# Patient Record
Sex: FEMALE | Race: BLACK OR AFRICAN AMERICAN | Employment: PART TIME | URBAN - METROPOLITAN AREA
[De-identification: names, ages, dates, MRNs, and addresses within clinical notes are randomized per-mention and may not be internally consistent; named-entity substitution may affect disease eponyms.]

---

## 2017-01-01 ENCOUNTER — OFFICE VISIT (OUTPATIENT)
Dept: CARDIOLOGY | Facility: CLINIC | Age: 67
End: 2017-01-01
Attending: PHYSICIAN ASSISTANT
Payer: MEDICARE

## 2017-01-01 ENCOUNTER — HOSPITAL ENCOUNTER (OUTPATIENT)
Dept: CARDIOLOGY | Facility: CLINIC | Age: 67
Discharge: HOME | End: 2017-12-06
Attending: PHYSICIAN ASSISTANT | Admitting: PHYSICIAN ASSISTANT
Payer: MEDICARE

## 2017-01-01 VITALS
HEART RATE: 90 BPM | HEIGHT: 64 IN | SYSTOLIC BLOOD PRESSURE: 123 MMHG | BODY MASS INDEX: 34.21 KG/M2 | DIASTOLIC BLOOD PRESSURE: 88 MMHG | WEIGHT: 200.4 LBS

## 2017-01-01 VITALS
BODY MASS INDEX: 34.25 KG/M2 | WEIGHT: 200.6 LBS | SYSTOLIC BLOOD PRESSURE: 124 MMHG | HEIGHT: 64 IN | HEART RATE: 81 BPM | DIASTOLIC BLOOD PRESSURE: 85 MMHG

## 2017-01-01 VITALS — DIASTOLIC BLOOD PRESSURE: 80 MMHG | SYSTOLIC BLOOD PRESSURE: 120 MMHG | HEART RATE: 96 BPM

## 2017-01-01 DIAGNOSIS — I48.92 ATRIAL FLUTTER, UNSPECIFIED TYPE (H): ICD-10-CM

## 2017-01-01 DIAGNOSIS — R07.89 CHEST DISCOMFORT: ICD-10-CM

## 2017-01-01 DIAGNOSIS — R07.89 CHEST DISCOMFORT: Primary | ICD-10-CM

## 2017-01-01 DIAGNOSIS — I48.20 CHRONIC ATRIAL FIBRILLATION (H): Primary | ICD-10-CM

## 2017-01-01 DIAGNOSIS — I48.20 CHRONIC ATRIAL FIBRILLATION (H): ICD-10-CM

## 2017-01-01 PROCEDURE — 93018 CV STRESS TEST I&R ONLY: CPT | Performed by: INTERNAL MEDICINE

## 2017-01-01 PROCEDURE — A9502 TC99M TETROFOSMIN: HCPCS | Performed by: PHYSICIAN ASSISTANT

## 2017-01-01 PROCEDURE — 34300033 ZZH RX 343: Performed by: PHYSICIAN ASSISTANT

## 2017-01-01 PROCEDURE — 93016 CV STRESS TEST SUPVJ ONLY: CPT | Performed by: INTERNAL MEDICINE

## 2017-01-01 PROCEDURE — 93000 ELECTROCARDIOGRAM COMPLETE: CPT | Performed by: PHYSICIAN ASSISTANT

## 2017-01-01 PROCEDURE — 93017 CV STRESS TEST TRACING ONLY: CPT

## 2017-01-01 PROCEDURE — 25000128 H RX IP 250 OP 636: Performed by: INTERNAL MEDICINE

## 2017-01-01 PROCEDURE — 99213 OFFICE O/P EST LOW 20 MIN: CPT | Performed by: PHYSICIAN ASSISTANT

## 2017-01-01 PROCEDURE — 78452 HT MUSCLE IMAGE SPECT MULT: CPT | Mod: 26 | Performed by: INTERNAL MEDICINE

## 2017-01-01 PROCEDURE — 99214 OFFICE O/P EST MOD 30 MIN: CPT | Performed by: PHYSICIAN ASSISTANT

## 2017-01-01 RX ORDER — ACYCLOVIR 200 MG/1
0-1 CAPSULE ORAL
Status: DISCONTINUED | OUTPATIENT
Start: 2017-01-01 | End: 2017-01-01 | Stop reason: HOSPADM

## 2017-01-01 RX ORDER — AMINOPHYLLINE 25 MG/ML
50-100 INJECTION, SOLUTION INTRAVENOUS
Status: COMPLETED | OUTPATIENT
Start: 2017-01-01 | End: 2017-01-01

## 2017-01-01 RX ORDER — ASPIRIN 81 MG
100 TABLET, DELAYED RELEASE (ENTERIC COATED) ORAL 2 TIMES DAILY
COMMUNITY

## 2017-01-01 RX ORDER — REGADENOSON 0.08 MG/ML
0.4 INJECTION, SOLUTION INTRAVENOUS ONCE
Status: COMPLETED | OUTPATIENT
Start: 2017-01-01 | End: 2017-01-01

## 2017-01-01 RX ORDER — METOPROLOL SUCCINATE 100 MG/1
100 TABLET, EXTENDED RELEASE ORAL 2 TIMES DAILY
Qty: 180 TABLET | Refills: 3 | Status: SHIPPED | OUTPATIENT
Start: 2017-01-01 | End: 2018-01-01

## 2017-01-01 RX ORDER — ALBUTEROL SULFATE 90 UG/1
2 AEROSOL, METERED RESPIRATORY (INHALATION) EVERY 5 MIN PRN
Status: DISCONTINUED | OUTPATIENT
Start: 2017-01-01 | End: 2017-01-01 | Stop reason: HOSPADM

## 2017-01-01 RX ADMIN — AMINOPHYLLINE 75 MG: 25 INJECTION, SOLUTION INTRAVENOUS at 14:43

## 2017-01-01 RX ADMIN — TETROFOSMIN 18.63 MCI.: 1.38 INJECTION, POWDER, LYOPHILIZED, FOR SOLUTION INTRAVENOUS at 13:54

## 2017-01-01 RX ADMIN — TETROFOSMIN 5.46 MCI.: 1.38 INJECTION, POWDER, LYOPHILIZED, FOR SOLUTION INTRAVENOUS at 12:30

## 2017-01-01 RX ADMIN — REGADENOSON 0.4 MG: 0.08 INJECTION, SOLUTION INTRAVENOUS at 13:51

## 2017-01-09 ENCOUNTER — HOSPITAL ENCOUNTER (OUTPATIENT)
Dept: CARDIOLOGY | Facility: CLINIC | Age: 67
Discharge: HOME | End: 2017-01-09
Attending: PHYSICIAN ASSISTANT | Admitting: PHYSICIAN ASSISTANT
Payer: MEDICARE

## 2017-01-09 DIAGNOSIS — I48.20 CHRONIC ATRIAL FIBRILLATION (H): ICD-10-CM

## 2017-01-09 PROCEDURE — 93225 XTRNL ECG REC<48 HRS REC: CPT

## 2017-01-09 PROCEDURE — 93227 XTRNL ECG REC<48 HR R&I: CPT | Performed by: INTERNAL MEDICINE

## 2017-01-19 ENCOUNTER — OFFICE VISIT (OUTPATIENT)
Dept: CARDIOLOGY | Facility: CLINIC | Age: 67
End: 2017-01-19
Attending: PHYSICIAN ASSISTANT
Payer: MEDICARE

## 2017-01-19 VITALS
BODY MASS INDEX: 32.65 KG/M2 | DIASTOLIC BLOOD PRESSURE: 82 MMHG | SYSTOLIC BLOOD PRESSURE: 124 MMHG | HEIGHT: 65 IN | HEART RATE: 88 BPM | WEIGHT: 196 LBS

## 2017-01-19 DIAGNOSIS — I48.20 CHRONIC ATRIAL FIBRILLATION (H): ICD-10-CM

## 2017-01-19 PROCEDURE — 99213 OFFICE O/P EST LOW 20 MIN: CPT | Performed by: PHYSICIAN ASSISTANT

## 2017-01-19 NOTE — PATIENT INSTRUCTIONS
1. Reviewed heart rate monitor worn 1/9.  Heart rate is not perfect on the metoprolol 100 mg twice a day   - We talked about adding a medication called DILTIAZEM to keep the heart rate down, but you are not interested in this right now.    2. Will plan to have you see Dr. Mccormick in 6 months with echocardiogram to check your pumping function of heart. We'll make sure that pumping strength is not worse given the rate of atrial fibrillation.  I do not anticipate this!    3. Call if you start getting palpitations or feel funny in heart and we can start the medication. My nurse Nisreen: [phone]

## 2017-01-19 NOTE — MR AVS SNAPSHOT
After Visit Summary   1/19/2017    Ele Mendoza    MRN: [id number]           Patient Information     Date Of Birth          1950        Visit Information        Provider Department      1/19/2017 12:15 PM Mariana George PA-C; [employer] [facility] HE[facility]        Today's Diagnoses     Chronic atrial fibrillation (H)           Care Instructions    1. Reviewed heart rate monitor worn 1/9.  Heart rate is not perfect on the metoprolol 100 mg twice a day   - We talked about adding a medication called DILTIAZEM to keep the heart rate down, but you are not interested in this right now.    2. Will plan to have you see Dr. Mccormick in 6 months with echocardiogram to check your pumping function of heart. We'll make sure that pumping strength is not worse given the rate of atrial fibrillation.  I do not anticipate this!    3. Call if you start getting palpitations or feel funny in heart and we can start the medication. My nurse Nisreen: [phone]        Follow-ups after your visit        Additional Services     Follow-Up with Cardiologist                 Future tests that were ordered for you today     Open Future Orders        Priority Expected Expires Ordered    Echocardiogram Routine 7/18/2017 1/19/2018 1/19/2017    Follow-Up with Cardiologist Routine 7/18/2017 1/19/2018 1/19/2017            Who to contact     If you have questions or need follow up information about today's clinic visit or your schedule please contact [facility] [place] directly at [phone].  Normal or non-critical lab and imaging results will be communicated to you by MyChart, letter or phone within 4 business days after the clinic has received the results. If you do not hear from us within 7 days, please contact the clinic through MyChart or phone. If you have a critical or abnormal lab result, we will notify you by phone as  "soon as possible.  Submit refill requests through [employer] or call your pharmacy and they will forward the refill request to us. Please allow 3 business days for your refill to be completed.          Additional Information About Your Visit        [employer] Information     [employer] lets you send messages to your doctor, view your test results, renew your prescriptions, schedule appointments and more. To sign up, go to [email or web address] . Click on \"Log in\" on the left side of the screen, which will take you to the Welcome page. Then click on \"Sign up Now\" on the right side of the page.     You will be asked to enter the access code listed below, as well as some personal information. Please follow the directions to create your username and password.     Your access code is: [email or web address]  Expires: 2017 12:58 PM     Your access code will  in 90 days. If you need help or a new code, please call your [place] clinic or [phone].        Care EveryWhere ID     This is your Care EveryWhere ID. This could be used by other organizations to access your [place] medical records  [id number]        Your Vitals Were     Pulse Height BMI (Body Mass Index)             88 1.651 m (5' 5\") 32.62 kg/m2          Blood Pressure from Last 3 Encounters:   17 124/82   16 136/88   16 110/76    Weight from Last 3 Encounters:   17 88.905 kg (196 lb)   16 87.998 kg (194 lb)   16 87.544 kg (193 lb)              We Performed the Following     Follow-Up with Cardiac Advanced Practice Provider        Primary Care Provider Office Phone # Fax #    Nereida Hany [phone]       [facility] [address] [facility] [address]        Thank you!     Thank you for choosing [facility] [place]  for your care. Our goal is always to provide you with excellent care. Hearing back from our patients is one way we can continue to improve our " services. Please take a few minutes to complete the written survey that you may receive in the mail after your visit with us. Thank you!             Your Updated Medication List - Protect others around you: Learn how to safely use, store and throw away your medicines at www.disposemymeds.org.          This list is accurate as of: 1/19/17 12:58 PM.  Always use your most recent med list.                   Brand Name Dispense Instructions for use    metoprolol 100 MG 24 hr tablet    TOPROL-XL    180 tablet    Take 1 tablet (100 mg) by mouth 2 times daily       rivaroxaban ANTICOAGULANT 20 MG Tabs tablet    XARELTO    90 tablet    Take 1 tablet (20 mg) by mouth daily

## 2017-01-19 NOTE — PROGRESS NOTES
HPI and Plan:   See dictation #[id number]    Orders Placed This Encounter   Procedures     Follow-Up with Cardiologist     Echocardiogram       Encounter Diagnosis   Name Primary?     Chronic atrial fibrillation (H)        CURRENT MEDICATIONS:  Current Outpatient Prescriptions   Medication Sig Dispense Refill     metoprolol (TOPROL-XL) 100 MG 24 hr tablet Take 1 tablet (100 mg) by mouth 2 times daily 180 tablet 3     rivaroxaban ANTICOAGULANT (XARELTO) 20 MG TABS tablet Take 1 tablet (20 mg) by mouth daily 90 tablet 3       ALLERGIES   No Known Allergies    PAST MEDICAL HISTORY:  Past Medical History   Diagnosis Date     Atrial fibrillation (H)      Cholelithiasis      Congestive heart failure (H)      Atrial flutter (H) 3/25/2015     Edema-pedal 5/29/2015     Dizziness 10/18/2016       PAST SURGICAL HISTORY:  History reviewed. No pertinent past surgical history.    FAMILY HISTORY:  Family History   Problem Relation Age of Onset     Coronary Artery Disease No family hx of      Unknown/Adopted Father      Unknown/Adopted Mother      Family History Negative Brother      Family History Negative Sister      Family History Negative Son      Family History Negative Daughter      Family History Negative Brother        SOCIAL HISTORY:  Social History     Social History     Marital Status: Single     Spouse Name: N/A     Number of Children: N/A     Years of Education: N/A     Social History Main Topics     Smoking status: Never Smoker      Smokeless tobacco: Never Used     Alcohol Use: No     Drug Use: No     Sexual Activity: Not Asked     Other Topics Concern     Caffeine Concern No     Sleep Concern No     Stress Concern No     Weight Concern No     Special Diet No     Back Care No     Exercise Yes     sometimes walking     Seat Belt Yes     Social History Narrative       Review of Systems:  Skin:  Negative       Eyes:  Negative for double vision;visual blurring    ENT:  Negative      Respiratory:  Negative for dyspnea on  "exertion;shortness of breath;cough     Cardiovascular:  Negative for;palpitations;chest pain;edema;syncope or near-syncope;exercise intolerance;lightheadedness;dizziness      Gastroenterology: Negative for hematochezia;melena;nausea;vomiting    Genitourinary:  Positive for nocturia    Musculoskeletal:  Positive for foot pain recent R foot surgery  Neurologic:  Negative for headaches;stroke    Psychiatric:  Negative      Heme/Lymph/Imm:  Negative      Endocrine:  Negative        Physical Exam:  Vitals: /82 mmHg  Pulse 88  Ht 1.651 m (5' 5\")  Wt 88.905 kg (196 lb)  BMI 32.62 kg/m2    Constitutional:  cooperative, alert and oriented, well developed, well nourished, in no acute distress        Skin:  warm and dry to the touch, no apparent skin lesions or masses noted        Head:  normocephalic, no masses or lesions        Eyes:  pupils equal and round;conjunctivae and lids unremarkable;sclera white;no xanthalasma;EOMS intact;no nystagmus        ENT:  no pallor or cyanosis, dentition good        Neck:  JVP normal;no carotid bruit        Chest:  normal breath sounds, clear to auscultation, normal A-P diameter, normal symmetry, normal respiratory excursion, no use of accessory muscles          Cardiac:   irregularly irregular rhythm                Abdomen:  abdomen soft        Vascular: pulses full and equal                                        Extremities and Back:  no edema;no deformities, clubbing, cyanosis, erythema observed         R foot wrapped    Neurological:  affect appropriate, oriented to time, person and place   No weakness, tongue deviation, paralysis, tremors or speech pattern changes. CN III-XII intact on exam today                    "

## 2017-01-19 NOTE — Clinical Note
1/19/2017    [facility]   [address]  [facility] [address]    RE: Ele Mendoza       Dear Colleague,    I had the pleasure of seeing Ele today when she came accompanied by her  and son for followup of her recent Holter monitor.  She is a pleasant 67-year-old.  She has a history of permanent atrial fibrillation for which she remains on Xarelto.  We have been working with some rate-controlling medications given a Holter monitor that Dr. Mccormick got back in 06/2016.  At that time, her average heart rate was 90 beats per minute; however, the reading physician noted that the heart rate was usually over 100 during the daytime hours.  At that time we increased her metoprolol XL to 200 mg twice daily.  She came back shortly thereafter with some lightheadedness/dizziness, and we decreased the dose but repeat Holter monitor showed worsening of her average heart rate to 96 with max up to 133.  She felt that the dizziness was more likely related to a migraine headache and actually at that time acquiesced to going back on metoprolol succinate 100 mg twice daily.  She now just had another Holter monitor to check her heart rate control on that and is back for results.      Ele tells me she is feeling well.  She recently had some right foot surgery and that has laid her up a bit, and she is not nearly as active as she would like.  She denies chest pain, pressure or tightness.  Denies orthopnea, PND, edema and denies any problems on the Xarelto therapy.      Holter monitor worn 01/09/2017 showed permanent atrial fibrillation with an average heart rate of 94 and minimum was down to 58, max was 122.  Again, we noted these rates when she was relatively inactive given her recent foot surgery.  Daytime hours, specifically between 10:00 a.m. and 2:00 p.m. tend to be in the low 100s.      Outpatient Encounter Prescriptions as of 1/19/2017   Medication Sig Dispense Refill      metoprolol (TOPROL-XL) 100 MG 24 hr tablet Take 1 tablet (100 mg) by mouth 2 times daily 180 tablet 3     rivaroxaban ANTICOAGULANT (XARELTO) 20 MG TABS tablet Take 1 tablet (20 mg) by mouth daily 90 tablet 3     No facility-administered encounter medications on file as of 1/19/2017.     ASSESSMENT AND PLAN:     1.  Permanent atrial fibrillation.  Heart rate control still not ideal given the fact that her average heart rates between   10 AM and 2 PM are generally in the low 100s/110s.  I did talk to her today about continuing metoprolol succinate 100 mg twice daily and adding diltiazem.  She is not interested in adding more medication or making any significant changes to her metoprolol dosing due to concerns regarding side effects.  She is asymptomatic, and we had a long discussion regarding the need to keep heart rate down.  I think at this point, however, with an average heart rate still in the 90s, I do not think we would have to worry about a tachycardia-induced cardiomyopathy as much and have agreed to let her continue to monitor this.      At this time, therefore, we will make no medication changes.  I have asked her to contact me if she starts getting any lightheadedness, palpitations or other problems, and we can certainly consider adding diltiazem at that time.  If, however, she is doing well, we will have her follow up with Dr. Mccormick in 6 months.  We will also check an echocardiogram at that time to ensure that her ejection fraction, which was normal in 06/2015 at 55%-60%, stays within normal limits.  I did not order a Holter monitor for 6 months from now given the fact that we are not making any medication changes.  However, if in between our visits if we end up making a change, we can certainly get a 24-hour monitor to ensure it is working.      It has been a pleasure to see her in clinic.  She will continue on Xarelto and see her primary cardiologist in 6 months.     Sincerely,    Mariana  Alex George PA-C     [facility]

## 2017-01-20 NOTE — PROGRESS NOTES
HISTORY OF PRESENT ILLNESS:  I had the pleasure of seeing Ele today when she came accompanied by her  and son for followup of her recent Holter monitor.  She is a pleasant 67-year-old.  She has a history of permanent atrial fibrillation for which she remains on Xarelto.  We have been working with some rate-controlling medications given a Holter monitor that Dr. Mccormick got back in 06/2016.  At that time, her average heart rate was 90 beats per minute; however, the reading physician noted that the heart rate was usually over 100 during the daytime hours.  At that time we increased her metoprolol XL to 200 mg twice daily.  She came back shortly thereafter with some lightheadedness/dizziness, and we decreased the dose but repeat Holter monitor showed worsening of her average heart rate to 96 with max up to 133.  She felt that the dizziness was more likely related to a migraine headache and actually at that time acquiesced to going back on metoprolol succinate 100 mg twice daily.  She now just had another Holter monitor to check her heart rate control on that and is back for results.      Ele tells me she is feeling well.  She recently had some right foot surgery and that has laid her up a bit, and she is not nearly as active as she would like.  She denies chest pain, pressure or tightness.  Denies orthopnea, PND, edema and denies any problems on the Xarelto therapy.      Holter monitor worn 01/09/2017 showed permanent atrial fibrillation with an average heart rate of 94 and minimum was down to 58, max was 122.  Again, we noted these rates when she was relatively inactive given her recent foot surgery.  Daytime hours, specifically between 10:00 a.m. and 2:00 p.m. tend to be in the low 100s.        ASSESSMENT AND PLAN:     1.  Permanent atrial fibrillation.  Heart rate control still not ideal given the fact that her average heart rates between   10 AM and 2 PM are generally in the low 100s/110s.  I  did talk to her today about continuing metoprolol succinate 100 mg twice daily and adding diltiazem.  She is not interested in adding more medication or making any significant changes to her metoprolol dosing due to concerns regarding side effects.  She is asymptomatic, and we had a long discussion regarding the need to keep heart rate down.  I think at this point, however, with an average heart rate still in the 90s, I do not think we would have to worry about a tachycardia-induced cardiomyopathy as much and have agreed to let her continue to monitor this.      At this time, therefore, we will make no medication changes.  I have asked her to contact me if she starts getting any lightheadedness, palpitations or other problems, and we can certainly consider adding diltiazem at that time.  If, however, she is doing well, we will have her follow up with Dr. Mccormick in 6 months.  We will also check an echocardiogram at that time to ensure that her ejection fraction, which was normal in 2015 at 55%-60%, stays within normal limits.  I did not order a Holter monitor for 6 months from now given the fact that we are not making any medication changes.  However, if in between our visits if we end up making a change, we can certainly get a 24-hour monitor to ensure it is working.      It has been a pleasure to see her in clinic.  She will continue on Xarelto and see her primary cardiologist in 6 months.         AMANDA VARGAS PA-C             D: 2017 13:08   T: 2017 23:57   MT: ANITA      Name:     PHILIP OLIVIA   MRN:      [id number]        Account:      [id number]   :      1950           Service Date: 2017      Document: [id number]

## 2017-01-27 ENCOUNTER — HOSPITAL ENCOUNTER (EMERGENCY)
Facility: CLINIC | Age: 67
Discharge: HOME | End: 2017-01-27
Attending: EMERGENCY MEDICINE | Admitting: EMERGENCY MEDICINE
Payer: MEDICARE

## 2017-01-27 ENCOUNTER — APPOINTMENT (OUTPATIENT)
Dept: GENERAL RADIOLOGY | Facility: CLINIC | Age: 67
End: 2017-01-27
Attending: EMERGENCY MEDICINE
Payer: MEDICARE

## 2017-01-27 ENCOUNTER — APPOINTMENT (OUTPATIENT)
Dept: CT IMAGING | Facility: CLINIC | Age: 67
End: 2017-01-27
Attending: EMERGENCY MEDICINE
Payer: MEDICARE

## 2017-01-27 VITALS
BODY MASS INDEX: 32.44 KG/M2 | HEART RATE: 104 BPM | DIASTOLIC BLOOD PRESSURE: 84 MMHG | WEIGHT: 190 LBS | TEMPERATURE: 100.1 F | OXYGEN SATURATION: 100 % | RESPIRATION RATE: 18 BRPM | SYSTOLIC BLOOD PRESSURE: 114 MMHG | HEIGHT: 64 IN

## 2017-01-27 DIAGNOSIS — J18.9 PNEUMONIA OF RIGHT LUNG DUE TO INFECTIOUS ORGANISM, UNSPECIFIED PART OF LUNG: ICD-10-CM

## 2017-01-27 DIAGNOSIS — R51.9 ACUTE NONINTRACTABLE HEADACHE, UNSPECIFIED HEADACHE TYPE: ICD-10-CM

## 2017-01-27 LAB
ALBUMIN UR-MCNC: NEGATIVE MG/DL
ANION GAP SERPL CALCULATED.3IONS-SCNC: 6 MMOL/L (ref 3–14)
APPEARANCE UR: CLEAR
BACTERIA #/AREA URNS HPF: ABNORMAL /HPF
BASOPHILS # BLD AUTO: 0 10E9/L (ref 0–0.2)
BASOPHILS NFR BLD AUTO: 0.6 %
BILIRUB UR QL STRIP: NEGATIVE
BUN SERPL-MCNC: 12 MG/DL (ref 7–30)
CALCIUM SERPL-MCNC: 8.5 MG/DL (ref 8.5–10.1)
CHLORIDE SERPL-SCNC: 105 MMOL/L (ref 94–109)
CO2 SERPL-SCNC: 25 MMOL/L (ref 20–32)
COLOR UR AUTO: ABNORMAL
CREAT SERPL-MCNC: 0.73 MG/DL (ref 0.52–1.04)
DIFFERENTIAL METHOD BLD: ABNORMAL
EOSINOPHIL # BLD AUTO: 0.1 10E9/L (ref 0–0.7)
EOSINOPHIL NFR BLD AUTO: 1.4 %
ERYTHROCYTE [DISTWIDTH] IN BLOOD BY AUTOMATED COUNT: 13.4 % (ref 10–15)
FLUAV+FLUBV AG SPEC QL: NEGATIVE
FLUAV+FLUBV AG SPEC QL: NORMAL
GFR SERPL CREATININE-BSD FRML MDRD: 80 ML/MIN/1.7M2
GLUCOSE SERPL-MCNC: 81 MG/DL (ref 70–99)
GLUCOSE UR STRIP-MCNC: NEGATIVE MG/DL
HCT VFR BLD AUTO: 40.9 % (ref 35–47)
HGB BLD-MCNC: 13.5 G/DL (ref 11.7–15.7)
HGB UR QL STRIP: NEGATIVE
IMM GRANULOCYTES # BLD: 0 10E9/L (ref 0–0.4)
IMM GRANULOCYTES NFR BLD: 0.2 %
INTERPRETATION ECG - MUSE: NORMAL
KETONES UR STRIP-MCNC: NEGATIVE MG/DL
LEUKOCYTE ESTERASE UR QL STRIP: NEGATIVE
LYMPHOCYTES # BLD AUTO: 0.7 10E9/L (ref 0.8–5.3)
LYMPHOCYTES NFR BLD AUTO: 13.1 %
MCH RBC QN AUTO: 29.3 PG (ref 26.5–33)
MCHC RBC AUTO-ENTMCNC: 33 G/DL (ref 31.5–36.5)
MCV RBC AUTO: 89 FL (ref 78–100)
MONOCYTES # BLD AUTO: 0.6 10E9/L (ref 0–1.3)
MONOCYTES NFR BLD AUTO: 11.5 %
NEUTROPHILS # BLD AUTO: 3.7 10E9/L (ref 1.6–8.3)
NEUTROPHILS NFR BLD AUTO: 73.2 %
NITRATE UR QL: NEGATIVE
NRBC # BLD AUTO: 0 10*3/UL
NRBC BLD AUTO-RTO: 0 /100
PH UR STRIP: 6.5 PH (ref 5–7)
PLATELET # BLD AUTO: 151 10E9/L (ref 150–450)
POTASSIUM SERPL-SCNC: 4.2 MMOL/L (ref 3.4–5.3)
RBC # BLD AUTO: 4.61 10E12/L (ref 3.8–5.2)
RBC #/AREA URNS AUTO: <1 /HPF (ref 0–2)
SODIUM SERPL-SCNC: 136 MMOL/L (ref 133–144)
SP GR UR STRIP: 1 (ref 1–1.03)
SPECIMEN SOURCE: NORMAL
URN SPEC COLLECT METH UR: ABNORMAL
UROBILINOGEN UR STRIP-MCNC: NORMAL MG/DL (ref 0–2)
WBC # BLD AUTO: 5 10E9/L (ref 4–11)
WBC #/AREA URNS AUTO: 0 /HPF (ref 0–2)

## 2017-01-27 PROCEDURE — 93005 ELECTROCARDIOGRAM TRACING: CPT

## 2017-01-27 PROCEDURE — 71020 XR CHEST 2 VW: CPT

## 2017-01-27 PROCEDURE — 96360 HYDRATION IV INFUSION INIT: CPT

## 2017-01-27 PROCEDURE — 87804 INFLUENZA ASSAY W/OPTIC: CPT | Performed by: EMERGENCY MEDICINE

## 2017-01-27 PROCEDURE — 70450 CT HEAD/BRAIN W/O DYE: CPT

## 2017-01-27 PROCEDURE — 80048 BASIC METABOLIC PNL TOTAL CA: CPT | Performed by: EMERGENCY MEDICINE

## 2017-01-27 PROCEDURE — 81001 URINALYSIS AUTO W/SCOPE: CPT | Performed by: EMERGENCY MEDICINE

## 2017-01-27 PROCEDURE — 85025 COMPLETE CBC W/AUTO DIFF WBC: CPT | Performed by: EMERGENCY MEDICINE

## 2017-01-27 PROCEDURE — 25000128 H RX IP 250 OP 636: Performed by: EMERGENCY MEDICINE

## 2017-01-27 PROCEDURE — 25000132 ZZH RX MED GY IP 250 OP 250 PS 637: Performed by: EMERGENCY MEDICINE

## 2017-01-27 PROCEDURE — 99285 EMERGENCY DEPT VISIT HI MDM: CPT | Mod: 25

## 2017-01-27 RX ORDER — LIDOCAINE 40 MG/G
CREAM TOPICAL
Status: DISCONTINUED | OUTPATIENT
Start: 2017-01-27 | End: 2017-01-27 | Stop reason: HOSPADM

## 2017-01-27 RX ORDER — TRAMADOL HYDROCHLORIDE 50 MG/1
100 TABLET ORAL ONCE
Status: COMPLETED | OUTPATIENT
Start: 2017-01-27 | End: 2017-01-27

## 2017-01-27 RX ORDER — SODIUM CHLORIDE 9 MG/ML
1000 INJECTION, SOLUTION INTRAVENOUS CONTINUOUS
Status: DISCONTINUED | OUTPATIENT
Start: 2017-01-27 | End: 2017-01-27 | Stop reason: HOSPADM

## 2017-01-27 RX ORDER — AZITHROMYCIN 250 MG/1
TABLET, FILM COATED ORAL
Qty: 6 TABLET | Refills: 0 | Status: SHIPPED | OUTPATIENT
Start: 2017-01-27 | End: 2017-02-01

## 2017-01-27 RX ORDER — TRAMADOL HYDROCHLORIDE 50 MG/1
50-100 TABLET ORAL EVERY 6 HOURS PRN
Qty: 20 TABLET | Refills: 0 | Status: SHIPPED | OUTPATIENT
Start: 2017-01-27

## 2017-01-27 RX ORDER — ACETAMINOPHEN 325 MG/1
975 TABLET ORAL ONCE
Status: COMPLETED | OUTPATIENT
Start: 2017-01-27 | End: 2017-01-27

## 2017-01-27 RX ADMIN — TRAMADOL HYDROCHLORIDE 100 MG: 50 TABLET, COATED ORAL at 14:12

## 2017-01-27 RX ADMIN — ACETAMINOPHEN 975 MG: 325 TABLET, FILM COATED ORAL at 12:45

## 2017-01-27 RX ADMIN — SODIUM CHLORIDE 1000 ML: 9 INJECTION, SOLUTION INTRAVENOUS at 13:14

## 2017-01-27 ASSESSMENT — ENCOUNTER SYMPTOMS
WEAKNESS: 1
HEADACHES: 1
CONSTIPATION: 0
COUGH: 1
DIZZINESS: 1
DIARRHEA: 0
ABDOMINAL PAIN: 0
SHORTNESS OF BREATH: 0
VOMITING: 0
SORE THROAT: 1
FEVER: 1
MYALGIAS: 1
NAUSEA: 0

## 2017-01-27 NOTE — DISCHARGE INSTRUCTIONS
Pneumonia (Adult)  Pneumonia is an infection deep within the lungs. It is in the small air sacs (alveoli). Pneumonia may be caused by a virus or bacteria. Pneumonia caused by bacteria is usually treated with an antibiotic. Severe cases may need to be treated in the hospital. Milder cases can be treated at home. Symptoms usually start to get better during the first 2 days of treatment.    Home care  Follow these guidelines when caring for yourself at home:    Rest at home for the first 2 to 3 days, or until you feel stronger. Don t let yourself get overly tired when you go back to your activities.    Stay away from cigarette smoke - yours or other people s.    You may use acetaminophen or ibuprofen to control fever or pain, unless another medicine was prescribed. If you have chronic liver or kidney disease, talk with your health care provider before using these medicines. Also talk with your provider if you ve had a stomach ulcer or GI bleeding. Don t give aspirin to anyone younger than 18 years of age who is ill with a fever. It may cause severe liver damage.    Your appetite may be poor, so a light diet is fine.    Drink 6 to 8 glasses of fluids every day to make sure you are getting enough fluids. Beverages can include water, sport drinks, sodas without caffeine, juices, tea, or soup. Fluids will help loosen secretions in the lung. This will make it easier for you to cough up the phlegm (sputum). If you also have heart or kidney disease, check with your health care provider before you drink extra fluids.    Take antibiotic medicine prescribed until it is all gone, even if you are feeling better after a few days.  Follow-up care  Follow up with your health care provider in the next 2 to 3 days, or as advised. This is to be sure the medicine is helping you get better.  If you are 65 or older, you should get a pneumococcal vaccine and a yearly flu (influenza) shot. You should also get these vaccines if you have  chronic lung disease like asthma, emphysema, or COPD. Ask your provider about this.  When to seek medical advice  Call your health care provider right away if any of these occur:    You don t get better within the first 48 hours of treatment    Shortness of breath gets worse    Rapid breathing (more than 25 breaths per minute)    Coughing up blood    Chest pain gets worse with breathing    Fever of 102 F (38 C) or higher that doesn t get better with fever medicine    Weakness, dizziness, or fainting that gets worse    Thirst or dry mouth that gets worse    Sinus pain, headache, or a stiff neck    Chest pain not caused by coughing    [id number] [employer]. [address] [place] [address]. All rights reserved. This information is not intended as a substitute for professional medical care. Always follow your healthcare professional's instructions.      Discharge Instructions  Headache    You were seen today for a headache. Headaches may be caused by many different things such as muscle tension, sinus inflammation, anxiety and stress, having too little sleep, too much alcohol, some medical conditions or injury. You may have a migraine, which is caused by changes in the blood vessels in your head.  At this time your doctor does not find that your headache is a sign of anything dangerous or life-threatening.  However, sometimes the signs of serious illness do not show up right away.  If you have new or worse symptoms, you may need to be seen again in the emergency department or by your primary doctor.      Return to the Emergency Department if:    You get a fever of 101 F or higher.    Your headache gets much worse.    You get a stiff neck with your headache.    You get a new headache that is different or worse than headaches you have had before.    You are vomiting and can t keep food or water down.    You have blurry or double vision or other problems with your eyes.    You have a new weakness on  one side of your body.    You have difficulty with balance which is new.    You or your family thinks you are confused.    You have a seizure or convulsion.    What can I do to help myself?    Pain medications - You may take a pain medication such as Tylenol  (acetaminophen), Advil , Nuprin  (ibuprofen) or Aleve  (naproxen).  If you have been given a narcotic such as Vicodin  (hydrocodone with acetaminophen), Percocet  (oxycodone with acetaminophen), codeine, or a muscle relaxant such as Flexeril  (cyclobenzaprine) or Soma  (carisoprodol), do not drive for four hours after you have taken it. If the narcotic contains Tylenol  (acetaminophen), do not take Tylenol  with it. All narcotics will cause constipation, so eat a high fiber diet.        Take a pain reliever as soon as you notice symptoms.  Starting medications as soon as you start to have symptoms may lessen the amount of pain you have.    Relaxing in a quiet, dark room may help.    Get enough sleep and eat meals regularly.    Schedule an appointment with your primary physician as instructed, or at least within 1 week.    You may need to watch for certain foods or other things which may trigger your headaches.  Keeping a journal of your headaches and possible triggers may help you and your primary doctor to identify things which you should avoid which may be causing your headaches.  If you were given a prescription for medicine here today, be sure to read all of the information (including the package insert) that comes with your prescription.  This will include important information about the medicine, its side effects, and any warnings that you need to know about.  The pharmacist who fills the prescription can provide more information and answer questions you may have about the medicine.  If you have questions or concerns that the pharmacist cannot address, please call or return to the Emergency Department.     Remember that you can always come back to the  Emergency Department if you are not able to see your regular doctor in the amount of time listed above, if you get any new symptoms, or if there is anything that worries you.

## 2017-01-27 NOTE — ED AVS SNAPSHOT
[facility] Emergency Department    [address]    [facility] [address]    Phone:  [phone]    Fax:  [phone]                                       Ele Mendoza   MRN: [id number]    Department:  [facility] Emergency Department   Date of Visit:  1/27/2017           Patient Information     Date Of Birth          1950        Your diagnoses for this visit were:     Pneumonia of right lung due to infectious organism, unspecified part of lung     Acute nonintractable headache, unspecified headache type        You were seen by Nadia Piper MD.      Follow-up Information     Follow up with Nereida Leach.    Specialty:  Family Practice    Why:  within 3-5 days as needed    Contact information:    [facility]  [address]  [facility] [address]  [phone]          Follow up with [facility] Emergency Department.    Specialty:  EMERGENCY MEDICINE    Why:  As needed, If symptoms worsen    Contact information:    [address] [facility] [address] [phone]        Discharge Instructions         Pneumonia (Adult)  Pneumonia is an infection deep within the lungs. It is in the small air sacs (alveoli). Pneumonia may be caused by a virus or bacteria. Pneumonia caused by bacteria is usually treated with an antibiotic. Severe cases may need to be treated in the hospital. Milder cases can be treated at home. Symptoms usually start to get better during the first 2 days of treatment.    Home care  Follow these guidelines when caring for yourself at home:    Rest at home for the first 2 to 3 days, or until you feel stronger. Don t let yourself get overly tired when you go back to your activities.    Stay away from cigarette smoke - yours or other people s.    You may use acetaminophen or ibuprofen to control fever or pain, unless another medicine was prescribed. If you have chronic liver or kidney disease, talk with your  health care provider before using these medicines. Also talk with your provider if you ve had a stomach ulcer or GI bleeding. Don t give aspirin to anyone younger than 18 years of age who is ill with a fever. It may cause severe liver damage.    Your appetite may be poor, so a light diet is fine.    Drink 6 to 8 glasses of fluids every day to make sure you are getting enough fluids. Beverages can include water, sport drinks, sodas without caffeine, juices, tea, or soup. Fluids will help loosen secretions in the lung. This will make it easier for you to cough up the phlegm (sputum). If you also have heart or kidney disease, check with your health care provider before you drink extra fluids.    Take antibiotic medicine prescribed until it is all gone, even if you are feeling better after a few days.  Follow-up care  Follow up with your health care provider in the next 2 to 3 days, or as advised. This is to be sure the medicine is helping you get better.  If you are 65 or older, you should get a pneumococcal vaccine and a yearly flu (influenza) shot. You should also get these vaccines if you have chronic lung disease like asthma, emphysema, or COPD. Ask your provider about this.  When to seek medical advice  Call your health care provider right away if any of these occur:    You don t get better within the first 48 hours of treatment    Shortness of breath gets worse    Rapid breathing (more than 25 breaths per minute)    Coughing up blood    Chest pain gets worse with breathing    Fever of 102 F (38 C) or higher that doesn t get better with fever medicine    Weakness, dizziness, or fainting that gets worse    Thirst or dry mouth that gets worse    Sinus pain, headache, or a stiff neck    Chest pain not caused by coughing    [phone] [employer]. [address] [place] [address], [place] [address]. All rights reserved. This information is not intended as a substitute for professional medical care. Always follow  your healthcare professional's instructions.      Discharge Instructions  Headache    You were seen today for a headache. Headaches may be caused by many different things such as muscle tension, sinus inflammation, anxiety and stress, having too little sleep, too much alcohol, some medical conditions or injury. You may have a migraine, which is caused by changes in the blood vessels in your head.  At this time your doctor does not find that your headache is a sign of anything dangerous or life-threatening.  However, sometimes the signs of serious illness do not show up right away.  If you have new or worse symptoms, you may need to be seen again in the emergency department or by your primary doctor.      Return to the Emergency Department if:    You get a fever of 101 F or higher.    Your headache gets much worse.    You get a stiff neck with your headache.    You get a new headache that is different or worse than headaches you have had before.    You are vomiting and can t keep food or water down.    You have blurry or double vision or other problems with your eyes.    You have a new weakness on one side of your body.    You have difficulty with balance which is new.    You or your family thinks you are confused.    You have a seizure or convulsion.    What can I do to help myself?    Pain medications - You may take a pain medication such as Tylenol  (acetaminophen), Advil , Nuprin  (ibuprofen) or Aleve  (naproxen).  If you have been given a narcotic such as Vicodin  (hydrocodone with acetaminophen), Percocet  (oxycodone with acetaminophen), codeine, or a muscle relaxant such as Flexeril  (cyclobenzaprine) or Soma  (carisoprodol), do not drive for four hours after you have taken it. If the narcotic contains Tylenol  (acetaminophen), do not take Tylenol  with it. All narcotics will cause constipation, so eat a high fiber diet.        Take a pain reliever as soon as you notice symptoms.  Starting medications as soon  as you start to have symptoms may lessen the amount of pain you have.    Relaxing in a quiet, dark room may help.    Get enough sleep and eat meals regularly.    Schedule an appointment with your primary physician as instructed, or at least within 1 week.    You may need to watch for certain foods or other things which may trigger your headaches.  Keeping a journal of your headaches and possible triggers may help you and your primary doctor to identify things which you should avoid which may be causing your headaches.  If you were given a prescription for medicine here today, be sure to read all of the information (including the package insert) that comes with your prescription.  This will include important information about the medicine, its side effects, and any warnings that you need to know about.  The pharmacist who fills the prescription can provide more information and answer questions you may have about the medicine.  If you have questions or concerns that the pharmacist cannot address, please call or return to the Emergency Department.     Remember that you can always come back to the Emergency Department if you are not able to see your regular doctor in the amount of time listed above, if you get any new symptoms, or if there is anything that worries you.        24 Hour Appointment Hotline       To make an appointment at any [facility], call [phone] ([phone]). If you don't have a family doctor or clinic, we will help you find one. [facility] are conveniently located to serve the needs of you and your family.             Review of your medicines      START taking        Dose / Directions Last dose taken    azithromycin 250 MG tablet   Commonly known as:  ZITHROMAX Z-RUDOLPH   Quantity:  6 tablet        Two tablets on the first day, then one tablet daily for the next 4 days   Refills:  0        traMADol 50 MG tablet   Commonly known as:  ULTRAM   Dose:   mg   Quantity:  20 tablet         Take 1-2 tablets ( mg) by mouth every 6 hours as needed for pain   Refills:  0          Our records show that you are taking the medicines listed below. If these are incorrect, please call your family doctor or clinic.        Dose / Directions Last dose taken    metoprolol 100 MG 24 hr tablet   Commonly known as:  TOPROL-XL   Dose:  100 mg   Quantity:  180 tablet        Take 1 tablet (100 mg) by mouth 2 times daily   Refills:  3        rivaroxaban ANTICOAGULANT 20 MG Tabs tablet   Commonly known as:  XARELTO   Dose:  20 mg   Quantity:  90 tablet        Take 1 tablet (20 mg) by mouth daily   Refills:  3                Prescriptions were sent or printed at these locations (2 Prescriptions)                   Other Prescriptions                Printed at Department/Unit printer (2 of 2)         azithromycin (ZITHROMAX Z-RUDOLPH) 250 MG tablet               traMADol (ULTRAM) 50 MG tablet                Procedures and tests performed during your visit     Basic metabolic panel    CBC with platelets differential    CT Head w/o Contrast    Chest XR,  PA & LAT    EKG 12 lead    Influenza A/B antigen    UA with Microscopic      Orders Needing Specimen Collection     None      Pending Results     No orders found from 1/26/2017 to 1/28/2017.            Pending Culture Results     No orders found from 1/26/2017 to 1/28/2017.       Test Results from your hospital stay           1/27/2017  1:01 PM - Interface, Flexilab Results      Component Results     Component Value Ref Range & Units Status    Influenza A/B Agn Specimen Nasal  Final    Influenza A Negative NEG Final    Influenza B  NEG Final    Negative   Test results must be correlated with clinical data. If necessary, results   should be confirmed by a molecular assay or viral culture.           1/27/2017  1:13 PM - Interface, Flexilab Results      Component Results     Component Value Ref Range & Units Status    WBC 5.0 4.0 - 11.0 10e9/L Final    RBC Count 4.61 3.8  - 5.2 10e12/L Final    Hemoglobin 13.5 11.7 - 15.7 g/dL Final    Hematocrit 40.9 35.0 - 47.0 % Final    MCV 89 78 - 100 fl Final    MCH 29.3 26.5 - 33.0 pg Final    MCHC 33.0 31.5 - 36.5 g/dL Final    RDW 13.4 10.0 - 15.0 % Final    Platelet Count 151 150 - 450 10e9/L Final    Diff Method Automated Method  Final    % Neutrophils 73.2 % Final    % Lymphocytes 13.1 % Final    % Monocytes 11.5 % Final    % Eosinophils 1.4 % Final    % Basophils 0.6 % Final    % Immature Granulocytes 0.2 % Final    Nucleated RBCs 0 0 /100 Final    Absolute Neutrophil 3.7 1.6 - 8.3 10e9/L Final    Absolute Lymphocytes 0.7 (L) 0.8 - 5.3 10e9/L Final    Absolute Monocytes 0.6 0.0 - 1.3 10e9/L Final    Absolute Eosinophils 0.1 0.0 - 0.7 10e9/L Final    Absolute Basophils 0.0 0.0 - 0.2 10e9/L Final    Abs Immature Granulocytes 0.0 0 - 0.4 10e9/L Final    Absolute Nucleated RBC 0.0  Final         1/27/2017  1:27 PM - Interface, Flexilab Results      Component Results     Component Value Ref Range & Units Status    Sodium 136 133 - 144 mmol/L Final    Potassium 4.2 3.4 - 5.3 mmol/L Final    Chloride 105 94 - 109 mmol/L Final    Carbon Dioxide 25 20 - 32 mmol/L Final    Anion Gap 6 3 - 14 mmol/L Final    Glucose 81 70 - 99 mg/dL Final    Urea Nitrogen 12 7 - 30 mg/dL Final    Creatinine 0.73 0.52 - 1.04 mg/dL Final    GFR Estimate 80 >60 mL/min/1.7m2 Final    Non  GFR Calc    GFR Estimate If Black >90   GFR Calc   >60 mL/min/1.7m2 Final    Calcium 8.5 8.5 - 10.1 mg/dL Final         1/27/2017  2:20 PM - Interface, Flexilab Results      Component Results     Component Value Ref Range & Units Status    Color Urine Light Yellow  Final    Appearance Urine Clear  Final    Glucose Urine Negative NEG mg/dL Final    Bilirubin Urine Negative NEG Final    Ketones Urine Negative NEG mg/dL Final    Specific Gravity Urine 1.001 (L) 1.003 - 1.035 Final    Blood Urine Negative NEG Final    pH Urine 6.5 5.0 - 7.0 pH Final     Protein Albumin Urine Negative NEG mg/dL Final    Urobilinogen mg/dL Normal 0.0 - 2.0 mg/dL Final    Nitrite Urine Negative NEG Final    Leukocyte Esterase Urine Negative NEG Final    Source Midstream Urine  Final    WBC Urine 0 0 - 2 /HPF Final    RBC Urine <1 0 - 2 /HPF Final    Bacteria Urine Few (A) NEG /HPF Final         1/27/2017  1:46 PM - Interface, Radiant Ib      Narrative     CT OF THE HEAD WITHOUT CONTRAST  1/27/2017 1:37 PM     COMPARISON: Head CT 10/11/2016.    HISTORY: Headache, anticoagulated.    TECHNIQUE: 5 mm thick axial CT images of the head were acquired  without IV contrast material.    FINDINGS:  There is mild diffuse cerebral volume loss. There are  subtle patchy areas of decreased density in the cerebral white matter  bilaterally that are consistent with sequela of chronic small vessel  ischemic disease.     The ventricles and basal cisterns are within normal limits in  configuration given the degree of cerebral volume loss.  There is no  midline shift. There are no extra-axial fluid collections.     No intracranial hemorrhage, mass or recent infarct.    The visualized paranasal sinuses are well-aerated. There is no  mastoiditis. There are no fractures of the visualized bones.         Impression     IMPRESSION:  Diffuse cerebral volume loss and cerebral white matter  changes consistent with chronic small vessel ischemic disease. No  evidence for acute intracranial pathology.     Radiation dose for this scan was reduced using automated exposure  control, adjustment of the mA and/or kV according to patient size, or  iterative reconstruction technique.    AL BORREGO MD         1/27/2017  1:59 PM - Interface, Radiant Ib      Narrative     XR CHEST 2 VW  1/27/2017 1:55 PM    HISTORY:  cough, fever    COMPARISON:  1/25/2014        Impression     IMPRESSION:  Heart size upper limits of normal or mildly prominent.  Thin band of density in the mid right lung abutting the minor fissure  could  represent some atelectasis, scarring or even subtle infiltrate,  new since the prior exam.     ANKIT LEON MD                Clinical Quality Measure: Blood Pressure Screening     Your blood pressure was checked while you were in the emergency department today. The last reading we obtained was  BP: 114/84 mmHg . Please read the guidelines below about what these numbers mean and what you should do about them.  If your systolic blood pressure (the top number) is less than 120 and your diastolic blood pressure (the bottom number) is less than 80, then your blood pressure is normal. There is nothing more that you need to do about it.  If your systolic blood pressure (the top number) is 120-139 or your diastolic blood pressure (the bottom number) is 80-89, your blood pressure may be higher than it should be. You should have your blood pressure rechecked within a year by a primary care provider.  If your systolic blood pressure (the top number) is 140 or greater or your diastolic blood pressure (the bottom number) is 90 or greater, you may have high blood pressure. High blood pressure is treatable, but if left untreated over time it can put you at risk for heart attack, stroke, or kidney failure. You should have your blood pressure rechecked by a primary care provider within the next 4 weeks.  If your provider in the emergency department today gave you specific instructions to follow-up with your doctor or provider even sooner than that, you should follow that instruction and not wait for up to 4 weeks for your follow-up visit.        Thank you for choosing [facility]       Thank you for choosing [facility] for your care. Our goal is always to provide you with excellent care. Hearing back from our patients is one way we can continue to improve our services. Please take a few minutes to complete the written survey that you may receive in the mail after you visit with us. Thank you!        MyChart Information     [facility]  "lets you send messages to your doctor, view your test results, renew your prescriptions, schedule appointments and more. To sign up, go to [email or web address] . Click on \"Log in\" on the left side of the screen, which will take you to the Welcome page. Then click on \"Sign up Now\" on the right side of the page.     You will be asked to enter the access code listed below, as well as some personal information. Please follow the directions to create your username and password.     Your access code is: [email or web address]  Expires: 2017 12:58 PM     Your access code will  in 90 days. If you need help or a new code, please call your [facility] clinic or [phone].        Care EveryWhere ID     This is your Care EveryWhere ID. This could be used by other organizations to access your [facility] medical records  [id number]        After Visit Summary       This is your record. Keep this with you and show to your community pharmacist(s) and doctor(s) at your next visit.                  "

## 2017-01-27 NOTE — ED PROVIDER NOTES
History     Chief Complaint:  Headache, cough, fever      HPI     History obtained through family member as .    Ele Mendoza is a 67 year old female with a history of atrial fibrillation and CHF, anticoagulated on Xarelto who presents with concerns for cough, headache and fever. The patient reports 2 days of cough, dizziness, headache, sore throat with coughing, myalgias, as well as a fever measured 102 last night. The patient has not had any recent travel or sick contacts. Patient has been taking ibuprofen for her symptoms. Patient has had decreased appetite the past 2 days but has been drinking fluids. She has had normal urination. The patient denies any diarrhea, abdominal pain, leg swelling, neck pain/stiffness or any other symptoms.      Allergies:  No known drug allergies.      Medications:    metoprolol (TOPROL-XL) 100 MG 24 hr tablet  rivaroxaban ANTICOAGULANT (XARELTO) 20 MG TABS tablet    Past Medical History:    Atrial fibrillation   Cholelithiasis   CHF  Pedal edema     Past Surgical History:    History reviewed. No pertinent past surgical history.    Family History:    History reviewed. No pertinent family history.     Social History:  Marital Status:  Single  Smoking status: Never smoker  Alcohol use: No   Presents to the ED with her son and granddaughter     Review of Systems   Constitutional: Positive for fever.   HENT: Positive for sore throat.    Respiratory: Positive for cough. Negative for shortness of breath.    Cardiovascular: Negative for chest pain.   Gastrointestinal: Negative for nausea, vomiting, abdominal pain, diarrhea and constipation.   Musculoskeletal: Positive for myalgias.   Neurological: Positive for dizziness, weakness (generalized) and headaches.   All other systems reviewed and are negative.      Physical Exam     Patient Vitals for the past 24 hrs:   BP Temp Temp src Pulse Heart Rate Resp SpO2 Height Weight   01/27/17 1430 114/84 mmHg - - - 92 - 100 % - -  "  01/27/17 1415 (!) 121/96 mmHg - - - 83 - 98 % - -   01/27/17 1317 - - - - 88 - 100 % - -   01/27/17 1308 122/90 mmHg - - - - - - - -   01/27/17 1219 123/90 mmHg 100.1  F (37.8  C) Oral 104 104 24 99 % 1.626 m (5' 4\") 86.183 kg (190 lb)      Physical Exam  General: Elderly female sitting upright  Eyes: PERRL, Conjunctive within normal limits  ENT: Moist mucous membranes, oropharynx clear.  Neck: No rigidity. Nontender.   CV: Normal S1S2, no murmur, rub or gallop. Irregular.   Resp: Diminished at bases bilaterally. No wheezes, rales or rhonchi. Normal respiratory effort.  GI: Abdomen is soft, nontender and nondistended. No palpable masses. No rebound or guarding.  MSK: No edema. Nontender. Normal active range of motion.  Skin: Warm and dry. No rashes or lesions or ecchymoses on visible skin.  Neuro: Alert and oriented. Responds appropriately to all questions and commands. No focal findings appreciated. Normal muscle tone.  Psych: Normal mood and affect. Pleasant.     Emergency Department Course   ECG:  @ 1257  Indication: Headache, fever  Vent. Rate 86 bpm. UT interval * ms. QRS duration 80 ms. QT/QTc 312/373 ms. P-R-T axis * 33 12.   Atrial fibrillation. Abnormal ECG.    Read @ 1257 by Dr. Piper.     Imaging:  Radiographic findings were communicated with the patient who voiced understanding of the findings.    CT Head without contrast  IMPRESSION:  Heart size upper limits of normal or mildly prominent.  Thin band of density in the mid right lung abutting the minor fissure  could represent some atelectasis, scarring or even subtle infiltrate,  new since the prior exam.   Report per radiology.      XR Chest PA & LAT  IMPRESSION:  Heart size upper limits of normal or mildly prominent.  Thin band of density in the mid right lung abutting the minor fissure  could represent some atelectasis, scarring or even subtle infiltrate,  new since the prior exam.   Report per radiology.     Laboratory:  Influenza A/B: negative "     CBC:  WBC 5.0, HGB 13.5,    CMP:  AWNL (Creatinine 0.73)     UA: Clear light yellow urine, specific gravity 1.001 (L), few bacteria, otherwise WNL     Interventions:  (1245) Tylenol 975 mg PO  (1314) Normal Saline, 1 liter, IV bolus   (1412) Tramadol 100 mg PO    Emergency Department Course:  Nursing notes and vitals reviewed.  (1232) I performed an exam of the patient as documented above. The patient was placed on continuous pulse oximetry and cardiac monitoring.     EKG was done, interpretation as above.  Blood was drawn from the patient. This was sent for laboratory testing, findings above.   Urine sample was obtained and sent for laboratory analysis, findings above.   The patient was sent for a head CT and chest xray while in the emergency department, findings above.    Patient reassessed. Is feeling more comfortable. Denies any new concerns. Agreeable with plan for discharge home.  Findings and plan explained to the patient. Patient discharged home with instructions regarding supportive care, medications, and reasons to return. The importance of close follow-up was reviewed. The patient was prescribed Tramadol and Azithromycin.      Impression & Plan      Medical Decision Making:  Ele Mendoza is a 67 year old female, on Xarelto for atrial fibrillation, presents to the ED with concerns for cough, congestion, body aches and headache. This seems most consistent with a viral syndrome such as influenza or influenza like illness. Influenza screen was negative here. She does have what appears to be an infiltrate in the right lung which I cannot exclude as early pneumonia. She will also be treated with Azithromycin. She was not hypoxic or in respiratory distress. Head CT was obtained as she is on Xarelto with severe headache and that was normal. I feel that her headache is likely part of the infectious syndrome. I do not think this is meningitis clinically. She had improvement with interventions here and  I feel comfortable discharging her home. She is in rate controlled atrial fibrillation on presentation here. She is recommended follow up in 3-5 days with PCP and return immediately to the ED should symptoms worsen. All questions were answered prior to discharge.     Diagnosis:    ICD-10-CM   1. Pneumonia of right lung due to infectious organism, unspecified part of lung J18.9   2. Acute nonintractable headache, unspecified headache type R51       Disposition:  Patient is discharged to home.     Discharge Medications:  New Prescriptions    AZITHROMYCIN (ZITHROMAX Z-RUDOLPH) 250 MG TABLET    Two tablets on the first day, then one tablet daily for the next 4 days    TRAMADOL (ULTRAM) 50 MG TABLET    Take 1-2 tablets ( mg) by mouth every 6 hours as needed for pain         Dung Kirk  1/27/2017   [facility] EMERGENCY DEPARTMENT    I, Dung Kirk, am serving as a scribe on 1/27/2017 at 12:32 PM to personally document services performed by Dr. Piper based on my observations and the provider's statements to me.      Nadia Piper MD  01/28/17 0912

## 2017-01-27 NOTE — ED AVS SNAPSHOT
[facility]    [address]    [facility] [address]    Phone:  [phone]    Fax:  [phone]                                       Ele Mendoza   MRN: [id number]    Department:  [facility]   Date of Visit:  1/27/2017           After Visit Summary Signature Page     I have received my discharge instructions, and my questions have been answered. I have discussed any challenges I see with this plan with the nurse or doctor.    ..........................................................................................................................................  Patient/Patient Representative Signature      ..........................................................................................................................................  Patient Representative Print Name and Relationship to Patient    ..................................................               ................................................  Date                                            Time    ..........................................................................................................................................  Reviewed by Signature/Title    ...................................................              ..............................................  Date                                                            Time

## 2017-01-27 NOTE — ED NOTES
Patient presents with multiple complaints:  Headache, dizziness,m generalized pain in the joints, weakness,  And frequent cough, sore throat.  Patient states that the pain increases in her head when she coughs.  ABCs intact.

## 2017-12-05 NOTE — LETTER
"12/5/2017    Nereida Hany  [facility]   [address]  [facility] [address]    RE: Ele Mendoza       Dear Colleague,    I had the pleasure of seeing Ele Mendoza in the [facility].    PI:   I had the pleasure of seeing Ele today when she came for concerns regarding palpitations. She is a very pleasant 67 year old who has a history of permanent atrial fibrillation for which she remains on Xarelto. We have obtained Holter monitors to assess rate control and adjusted her metoprolol. At her last appointment with me 1/2017, her average heart rate was in the 90s and she was feeling good, and no changes were made. She was supposed to see us back over the summer with an echocardiogram and see Dr. Mccormick, but that was not done. She contacted our office on 12/1 complaining of increasing palpitations, and this appointment was made for today.    It turns out that actually she has not been complaining of the sensation of her heart beat, but instead describes a \"heaviness/weakness\" of her left chest that is occurred over the last month. This usually happens in the morning, but she cannot put it together with any exertion. She does not experience shortness of breath associated, but she does note that it seems a bit harder to get up and down stairs because of shortness of breath. She states that the discomfort tends to last about 5-10 minutes, and she lays down for about 20 and feels much better.    She has not had dizziness, lightheadedness, racing heart, edema, orthopnea or other concerns. She remains on her typical medications without issues.    Echocardiogram 6/2015 showed EF 55-60%, moderate-severe biatrial enlargement and 1-2 TR.   Holter 1/2017 on Metoprolol Succinate 100 mg BID showed average HR 94 (58,122)  BMP 1/2017 was wnl. CBC 1/2017 was wnl.    EKG today showed AFib @ 86 bpm with nonspecific T wave changes. She has Q waves in V1 and V2, where previous " "EKGs have really shown these only in lead V1.      Assessment & Plan:    1. \"Weakness\" -   * These episodes are not necessarily correlating with exertion, but they certainly are new. She describes these as a \"weakness/pressure/heaviness\"  * She has no known history of coronary disease. She had a stress test in 3/2013 but I am unable to see the results of this. Last echocardiogram 6/2015 showed a preserved ejection fraction.     PLAN:    * Nuclear stress test.   * See me back to review    2. Permanent AFib -   * She does not appear to be having any issues with these and denies palpitations or symptoms of racing heart.     PLAN:    * Continue Xarelto 20 mg daily   * Continue rate control with Metoprolol XL         Palak George PA-C, MSPAS      Orders Placed This Encounter   Procedures     NM Lexiscan stress test (nuc card)     Follow-Up with Cardiac Advanced Practice Provider     EKG 12-lead complete w/read - Clinics (performed today)     Orders Placed This Encounter   Medications     docusate sodium (COLACE) 100 MG tablet     Sig: Take 100 mg by mouth 2 times daily     metoprolol (TOPROL-XL) 100 MG 24 hr tablet     Sig: Take 1 tablet (100 mg) by mouth 2 times daily     Dispense:  180 tablet     Refill:  3     Medications Discontinued During This Encounter   Medication Reason     metoprolol (TOPROL-XL) 100 MG 24 hr tablet Reorder         Encounter Diagnoses   Name Primary?     Chronic atrial fibrillation (H) Yes     Chest discomfort      Atrial flutter, unspecified type (H)        CURRENT MEDICATIONS:  Current Outpatient Prescriptions   Medication Sig Dispense Refill     docusate sodium (COLACE) 100 MG tablet Take 100 mg by mouth 2 times daily       metoprolol (TOPROL-XL) 100 MG 24 hr tablet Take 1 tablet (100 mg) by mouth 2 times daily 180 tablet 3     rivaroxaban ANTICOAGULANT (XARELTO) 20 MG TABS tablet Take 1 tablet (20 mg) by mouth daily 90 tablet 1     traMADol (ULTRAM) 50 MG tablet Take 1-2 tablets ( mg) " by mouth every 6 hours as needed for pain 20 tablet 0     [DISCONTINUED] metoprolol (TOPROL-XL) 100 MG 24 hr tablet Take 1 tablet (100 mg) by mouth 2 times daily 180 tablet 3       ALLERGIES     Allergies   Allergen Reactions     Aspirin GI Disturbance       PAST MEDICAL HISTORY:  Past Medical History:   Diagnosis Date     Atrial fibrillation (H)      Atrial flutter (H) 3/25/2015     Cholelithiasis      Congestive heart failure (H)      Dizziness 10/18/2016     Edema-pedal 5/29/2015       PAST SURGICAL HISTORY:  History reviewed. No pertinent surgical history.    FAMILY HISTORY:  Family History   Problem Relation Age of Onset     Unknown/Adopted Father      Unknown/Adopted Mother      Family History Negative Brother      Family History Negative Sister      Family History Negative Son      Family History Negative Daughter      Family History Negative Brother      Coronary Artery Disease No family hx of        SOCIAL HISTORY:  Social History     Social History     Marital status: Single     Spouse name: N/A     Number of children: N/A     Years of education: N/A     Social History Main Topics     Smoking status: Never Smoker     Smokeless tobacco: Never Used     Alcohol use No     Drug use: No     Sexual activity: Not Asked     Other Topics Concern     Caffeine Concern No     Sleep Concern No     Stress Concern No     Weight Concern No     Special Diet No     Back Care No     Exercise Yes     sometimes walking     Seat Belt Yes     Social History Narrative       Review of Systems:  Skin:  Negative     Eyes:  Negative for double vision;visual blurring  ENT:  Negative    Respiratory:  Negative for dyspnea on exertion;shortness of breath;cough  Cardiovascular:  Negative for;chest pain;edema;syncope or near-syncope;exercise intolerance;lightheadedness;dizziness Positive for;palpitations  Gastroenterology: Negative for hematochezia;melena;nausea;vomiting  Genitourinary:  Positive for nocturia  Musculoskeletal:  Positive  "for foot pain  Neurologic:  Negative for headaches;stroke  Psychiatric:  Negative    Heme/Lymph/Imm:  Negative    Endocrine:  Negative      Physical Exam:  Vitals: /85  Pulse 81  Ht 1.626 m (5' 4\")  Wt 91 kg (200 lb 9.6 oz)  BMI 34.43 kg/m2    Constitutional:  cooperative, alert and oriented, well developed, well nourished, in no acute distress        Skin:  warm and dry to the touch, no apparent skin lesions or masses noted        Head:  normocephalic, no masses or lesions        Eyes:  pupils equal and round;conjunctivae and lids unremarkable;sclera white;no xanthalasma;EOMS intact;no nystagmus        ENT:  no pallor or cyanosis, dentition good        Neck:  JVP normal;no carotid bruit        Chest:  normal breath sounds, clear to auscultation, normal A-P diameter, normal symmetry, normal respiratory excursion, no use of accessory muscles        Cardiac:   irregularly irregular rhythm                Abdomen:  abdomen soft        Vascular: pulses full and equal                                      Extremities and Back:  no edema;no deformities, clubbing, cyanosis, erythema observed   R foot wrapped    Neurological:      No weakness, tongue deviation, paralysis, tremors or speech pattern changes. CN III-XII intact on exam today      Recent Lab Results:  LIPID RESULTS:  No results found for: CHOL, HDL, LDL, TRIG, CHOLHDLRATIO    LIVER ENZYME RESULTS:  Lab Results   Component Value Date    AST 16 05/27/2016    ALT 17 05/27/2016       CBC RESULTS:  Lab Results   Component Value Date    WBC 5.0 01/27/2017    RBC 4.61 01/27/2017    HGB 13.5 01/27/2017    HCT 40.9 01/27/2017    MCV 89 01/27/2017    MCH 29.3 01/27/2017    MCHC 33.0 01/27/2017    RDW 13.4 01/27/2017     01/27/2017       BMP RESULTS:  Lab Results   Component Value Date     01/27/2017    POTASSIUM 4.2 01/27/2017    CHLORIDE 105 01/27/2017    CO2 25 01/27/2017    ANIONGAP 6 01/27/2017    GLC 81 01/27/2017    BUN 12 01/27/2017    CR 0.73 " 01/27/2017    GFRESTIMATED 80 01/27/2017    GFRESTBLACK >90   GFR Calc   01/27/2017    INDIANA 8.5 01/27/2017        A1C RESULTS:  No results found for: A1C    INR RESULTS:  Lab Results   Component Value Date    INR 1.60 (H) 05/04/2015    INR 0.94 01/21/2013     Thank you for allowing me to participate in the care of your patient.    Sincerely,     Mariana George PA-C     [facility]

## 2017-12-05 NOTE — MR AVS SNAPSHOT
After Visit Summary   12/5/2017    Ele Mendoza    MRN: [id number]           Patient Information     Date Of Birth          1950        Visit Information        Provider Department      12/5/2017 1:15 PM Mariana George PA-C; [employer] [facility]        Today's Diagnoses     Chronic atrial fibrillation (H)    -  1    Chest discomfort        Atrial flutter, unspecified type (H)          Care Instructions    1. Due to these funny feelings you have been getting in your chest, we will get a chemical stress test    2. See me back to review    3. New Rx x 90 days of metoprolol sent to [facility]. I did not send Xarelto as you have plenty!    4. My nurse Nisreen: [phone]          Follow-ups after your visit        Additional Services     Follow-Up with Cardiac Advanced Practice Provider                 Future tests that were ordered for you today     Open Future Orders        Priority Expected Expires Ordered    NM Lexiscan stress test (nuc card) Routine 12/12/2017 12/5/2018 12/5/2017    Follow-Up with Cardiac Advanced Practice Provider Routine 12/12/2017 12/5/2018 12/5/2017            Who to contact     If you have questions or need follow up information about today's clinic visit or your schedule please contact [facility] directly at [phone].  Normal or non-critical lab and imaging results will be communicated to you by MyChart, letter or phone within 4 business days after the clinic has received the results. If you do not hear from us within 7 days, please contact the clinic through MyChart or phone. If you have a critical or abnormal lab result, we will notify you by phone as soon as possible.  Submit refill requests through [employer] or call your pharmacy and they will forward the refill request to us. Please allow 3 business days for your refill to be completed.          Additional  "Information About Your Visit        MyChart Information     [employer] lets you send messages to your doctor, view your test results, renew your prescriptions, schedule appointments and more. To sign up, go to [email or web address] . Click on \"Log in\" on the left side of the screen, which will take you to the Welcome page. Then click on \"Sign up Now\" on the right side of the page.     You will be asked to enter the access code listed below, as well as some personal information. Please follow the directions to create your username and password.     Your access code is: [email or web address]  Expires: 3/5/2018  1:56 PM     Your access code will  in 90 days. If you need help or a new code, please call your [facility] clinic or [phone].        Care EveryWhere ID     This is your Care EveryWhere ID. This could be used by other organizations to access your [facility] medical records  [id number]        Your Vitals Were     Pulse Height BMI (Body Mass Index)             81 1.626 m (5' 4\") 34.43 kg/m2          Blood Pressure from Last 3 Encounters:   17 124/85   17 114/84   17 124/82    Weight from Last 3 Encounters:   17 91 kg (200 lb 9.6 oz)   17 86.2 kg (190 lb)   17 88.9 kg (196 lb)              We Performed the Following     EKG 12-lead complete w/read - Clinics (performed today)     Follow-Up with Cardiologist          Where to get your medicines      These medications were [facility] to [id number] - [place], [place] [facility] [address]  [address] [address] [facility] [address]     Phone:  [phone]     metoprolol 100 MG 24 hr tablet          Primary Care Provider Office Phone # Fax #    Nereida Leach [phone]       [facility] [address] [facility] [address]        Equal Access to Services     SHIRA HARRELL : Celia Srivastava, jojo madison, qaybta kaalmada ortiz adkins. So [facility] " [phone].    ATENCIÓN: Si marvel zhao, tiene a bright disposición servicios gratuitos de asistencia lingüística. Leopoldo bashir [phone].    We comply with applicable federal civil rights laws and [place] laws. We do not discriminate on the basis of race, color, national origin, age, disability, sex, sexual orientation, or gender identity.            Thank you!     Thank you for choosing [facility]  for your care. Our goal is always to provide you with excellent care. Hearing back from our patients is one way we can continue to improve our services. Please take a few minutes to complete the written survey that you may receive in the mail after your visit with us. Thank you!             Your Updated Medication List - Protect others around you: Learn how to safely use, store and throw away your medicines at www.disposemymeds.org.          This list is accurate as of: 12/5/17  1:56 PM.  Always use your most recent med list.                   Brand Name Dispense Instructions for use Diagnosis    docusate sodium 100 MG tablet    COLACE     Take 100 mg by mouth 2 times daily        metoprolol 100 MG 24 hr tablet    TOPROL-XL    180 tablet    Take 1 tablet (100 mg) by mouth 2 times daily    Atrial flutter, unspecified type (H)       rivaroxaban ANTICOAGULANT 20 MG Tabs tablet    XARELTO    90 tablet    Take 1 tablet (20 mg) by mouth daily    Atrial flutter, unspecified type (H)       traMADol 50 MG tablet    ULTRAM    20 tablet    Take 1-2 tablets ( mg) by mouth every 6 hours as needed for pain

## 2017-12-05 NOTE — PATIENT INSTRUCTIONS
1. Due to these funny feelings you have been getting in your chest, we will get a chemical stress test    2. See me back to review    3. New Rx x 90 days of metoprolol sent to [facility]. I did not send Xarelto as you have plenty!    4. My nurse Nisreen: [phone]

## 2017-12-05 NOTE — PROGRESS NOTES
"PI:   I had the pleasure of seeing Ele today when she came for concerns regarding palpitations. She is a very pleasant 67 year old who has a history of permanent atrial fibrillation for which she remains on Xarelto. We have obtained Holter monitors to assess rate control and adjusted her metoprolol. At her last appointment with me 1/2017, her average heart rate was in the 90s and she was feeling good, and no changes were made. She was supposed to see us back over the summer with an echocardiogram and see Dr. Mccormick, but that was not done. She contacted our office on 12/1 complaining of increasing palpitations, and this appointment was made for today.    It turns out that actually she has not been complaining of the sensation of her heart beat, but instead describes a \"heaviness/weakness\" of her left chest that is occurred over the last month. This usually happens in the morning, but she cannot put it together with any exertion. She does not experience shortness of breath associated, but she does note that it seems a bit harder to get up and down stairs because of shortness of breath. She states that the discomfort tends to last about 5-10 minutes, and she lays down for about 20 and feels much better.    She has not had dizziness, lightheadedness, racing heart, edema, orthopnea or other concerns. She remains on her typical medications without issues.    Echocardiogram 6/2015 showed EF 55-60%, moderate-severe biatrial enlargement and 1-2 TR.   Holter 1/2017 on Metoprolol Succinate 100 mg BID showed average HR 94 (58,122)  BMP 1/2017 was wnl. CBC 1/2017 was wnl.    EKG today showed AFib @ 86 bpm with nonspecific T wave changes. She has Q waves in V1 and V2, where previous EKGs have really shown these only in lead V1.      Assessment & Plan:    1. \"Weakness\" -   * These episodes are not necessarily correlating with exertion, but they certainly are new. She describes these as a " "\"weakness/pressure/heaviness\"  * She has no known history of coronary disease. She had a stress test in 3/2013 but I am unable to see the results of this. Last echocardiogram 6/2015 showed a preserved ejection fraction.     PLAN:    * Nuclear stress test.   * See me back to review    2. Permanent AFib -   * She does not appear to be having any issues with these and denies palpitations or symptoms of racing heart.     PLAN:    * Continue Xarelto 20 mg daily   * Continue rate control with Metoprolol XL         Palak George PA-C, MSPAS      Orders Placed This Encounter   Procedures     NM Lexiscan stress test (nuc card)     Follow-Up with Cardiac Advanced Practice Provider     EKG 12-lead complete w/read - Clinics (performed today)     Orders Placed This Encounter   Medications     docusate sodium (COLACE) 100 MG tablet     Sig: Take 100 mg by mouth 2 times daily     metoprolol (TOPROL-XL) 100 MG 24 hr tablet     Sig: Take 1 tablet (100 mg) by mouth 2 times daily     Dispense:  180 tablet     Refill:  3     Medications Discontinued During This Encounter   Medication Reason     metoprolol (TOPROL-XL) 100 MG 24 hr tablet Reorder         Encounter Diagnoses   Name Primary?     Chronic atrial fibrillation (H) Yes     Chest discomfort      Atrial flutter, unspecified type (H)        CURRENT MEDICATIONS:  Current Outpatient Prescriptions   Medication Sig Dispense Refill     docusate sodium (COLACE) 100 MG tablet Take 100 mg by mouth 2 times daily       metoprolol (TOPROL-XL) 100 MG 24 hr tablet Take 1 tablet (100 mg) by mouth 2 times daily 180 tablet 3     rivaroxaban ANTICOAGULANT (XARELTO) 20 MG TABS tablet Take 1 tablet (20 mg) by mouth daily 90 tablet 1     traMADol (ULTRAM) 50 MG tablet Take 1-2 tablets ( mg) by mouth every 6 hours as needed for pain 20 tablet 0     [DISCONTINUED] metoprolol (TOPROL-XL) 100 MG 24 hr tablet Take 1 tablet (100 mg) by mouth 2 times daily 180 tablet 3       ALLERGIES     Allergies " "  Allergen Reactions     Aspirin GI Disturbance       PAST MEDICAL HISTORY:  Past Medical History:   Diagnosis Date     Atrial fibrillation (H)      Atrial flutter (H) 3/25/2015     Cholelithiasis      Congestive heart failure (H)      Dizziness 10/18/2016     Edema-pedal 5/29/2015       PAST SURGICAL HISTORY:  History reviewed. No pertinent surgical history.    FAMILY HISTORY:  Family History   Problem Relation Age of Onset     Unknown/Adopted Father      Unknown/Adopted Mother      Family History Negative Brother      Family History Negative Sister      Family History Negative Son      Family History Negative Daughter      Family History Negative Brother      Coronary Artery Disease No family hx of        SOCIAL HISTORY:  Social History     Social History     Marital status: Single     Spouse name: N/A     Number of children: N/A     Years of education: N/A     Social History Main Topics     Smoking status: Never Smoker     Smokeless tobacco: Never Used     Alcohol use No     Drug use: No     Sexual activity: Not Asked     Other Topics Concern     Caffeine Concern No     Sleep Concern No     Stress Concern No     Weight Concern No     Special Diet No     Back Care No     Exercise Yes     sometimes walking     Seat Belt Yes     Social History Narrative       Review of Systems:  Skin:  Negative     Eyes:  Negative for double vision;visual blurring  ENT:  Negative    Respiratory:  Negative for dyspnea on exertion;shortness of breath;cough  Cardiovascular:  Negative for;chest pain;edema;syncope or near-syncope;exercise intolerance;lightheadedness;dizziness Positive for;palpitations  Gastroenterology: Negative for hematochezia;melena;nausea;vomiting  Genitourinary:  Positive for nocturia  Musculoskeletal:  Positive for foot pain  Neurologic:  Negative for headaches;stroke  Psychiatric:  Negative    Heme/Lymph/Imm:  Negative    Endocrine:  Negative      Physical Exam:  Vitals: /85  Pulse 81  Ht 1.626 m (5' 4\")  " Wt 91 kg (200 lb 9.6 oz)  BMI 34.43 kg/m2    Constitutional:  cooperative, alert and oriented, well developed, well nourished, in no acute distress        Skin:  warm and dry to the touch, no apparent skin lesions or masses noted        Head:  normocephalic, no masses or lesions        Eyes:  pupils equal and round;conjunctivae and lids unremarkable;sclera white;no xanthalasma;EOMS intact;no nystagmus        ENT:  no pallor or cyanosis, dentition good        Neck:  JVP normal;no carotid bruit        Chest:  normal breath sounds, clear to auscultation, normal A-P diameter, normal symmetry, normal respiratory excursion, no use of accessory muscles        Cardiac:   irregularly irregular rhythm                Abdomen:  abdomen soft        Vascular: pulses full and equal                                      Extremities and Back:  no edema;no deformities, clubbing, cyanosis, erythema observed   R foot wrapped    Neurological:      No weakness, tongue deviation, paralysis, tremors or speech pattern changes. CN III-XII intact on exam today      Recent Lab Results:  LIPID RESULTS:  No results found for: CHOL, HDL, LDL, TRIG, CHOLHDLRATIO    LIVER ENZYME RESULTS:  Lab Results   Component Value Date    AST 16 05/27/2016    ALT 17 05/27/2016       CBC RESULTS:  Lab Results   Component Value Date    WBC 5.0 01/27/2017    RBC 4.61 01/27/2017    HGB 13.5 01/27/2017    HCT 40.9 01/27/2017    MCV 89 01/27/2017    MCH 29.3 01/27/2017    MCHC 33.0 01/27/2017    RDW 13.4 01/27/2017     01/27/2017       BMP RESULTS:  Lab Results   Component Value Date     01/27/2017    POTASSIUM 4.2 01/27/2017    CHLORIDE 105 01/27/2017    CO2 25 01/27/2017    ANIONGAP 6 01/27/2017    GLC 81 01/27/2017    BUN 12 01/27/2017    CR 0.73 01/27/2017    GFRESTIMATED 80 01/27/2017    GFRESTBLACK >90   GFR Calc   01/27/2017    INDIANA 8.5 01/27/2017        A1C RESULTS:  No results found for: A1C    INR RESULTS:  Lab Results    Component Value Date    INR 1.60 (H) 05/04/2015    INR 0.94 01/21/2013

## 2017-12-08 NOTE — PATIENT INSTRUCTIONS
1. Stress test looked perfect! No evidence of blockages and good pumping strength of heart.    2. Limit caffeine!    3. See us back in ~ 6 months but call if you need to be seen sooner! My nurse Nisreen: [phone]

## 2017-12-08 NOTE — PROGRESS NOTES
"HPI:    had the pleasure of seeing Kaiden today when they came for follow up test results.  Her history is well documented in my 12/5/17 note (just a few days ago).  She had complained of chest \"heaviness\" or \"weakness\" and we obtained a stress test just yesterday. She has not had any spells since I saw her.      She has not had dizziness, lightheadedness, racing heart, edema, orthopnea or other concerns. She remains on her typical medications without issues.     Stress test 12/6/17 showed no evidence of ischemia and an EF of 61%.     Echocardiogram 6/2015 showed EF 55-60%, moderate-severe biatrial enlargement and 1-2 TR.   Holter 1/2017 on Metoprolol Succinate 100 mg BID showed average HR 94 (58,122)  BMP 1/2017 was wnl. CBC 1/2017 was wnl.     EKG 12/5 showed AFib @ 86 bpm with nonspecific T wave changes. She has Q waves in V1 and V2, where previous EKGs have really shown these only in lead V1.     Assessment & Plan:    1. Chest 'heaviness' -   She was relieved to hear that her stress test was normal.  As in retrospect, these episodes occurred after drinking [place] tea (which Nena tells me has a high amount of caffeine), she thinks these might be related   PLAN:   * Decrease caffeine intake    2. Permanent AFib -   Under good control. No changes. We could always increase metoprolol XL if needed   PLAN:   * 6 month FU   * Continue AC      Palak George PA-C, MSPAS      Orders Placed This Encounter   Procedures     Follow-Up with Cardiologist     EKG 12-lead complete w/read - Clinics     No orders of the defined types were placed in this encounter.    There are no discontinued medications.      Encounter Diagnoses   Name Primary?     Chronic atrial fibrillation (H)      Chest discomfort Yes       CURRENT MEDICATIONS:  Current Outpatient Prescriptions   Medication Sig Dispense Refill     docusate sodium (COLACE) 100 MG tablet Take 100 mg by mouth 2 times daily       metoprolol (TOPROL-XL) 100 MG 24 hr tablet " Take 1 tablet (100 mg) by mouth 2 times daily 180 tablet 3     rivaroxaban ANTICOAGULANT (XARELTO) 20 MG TABS tablet Take 1 tablet (20 mg) by mouth daily 90 tablet 3     traMADol (ULTRAM) 50 MG tablet Take 1-2 tablets ( mg) by mouth every 6 hours as needed for pain 20 tablet 0       ALLERGIES     Allergies   Allergen Reactions     Aspirin GI Disturbance       PAST MEDICAL HISTORY:  Past Medical History:   Diagnosis Date     Atrial fibrillation (H)      Atrial flutter (H) 3/25/2015     Cholelithiasis      Congestive heart failure (H)      Dizziness 10/18/2016     Edema-pedal 5/29/2015       PAST SURGICAL HISTORY:  History reviewed. No pertinent surgical history.    FAMILY HISTORY:  Family History   Problem Relation Age of Onset     Unknown/Adopted Father      Unknown/Adopted Mother      Family History Negative Brother      Family History Negative Sister      Family History Negative Son      Family History Negative Daughter      Family History Negative Brother      Coronary Artery Disease No family hx of        SOCIAL HISTORY:  Social History     Social History     Marital status: Single     Spouse name: N/A     Number of children: N/A     Years of education: N/A     Social History Main Topics     Smoking status: Never Smoker     Smokeless tobacco: Never Used     Alcohol use No     Drug use: No     Sexual activity: Not Asked     Other Topics Concern     Caffeine Concern No     Sleep Concern No     Stress Concern No     Weight Concern No     Special Diet No     Back Care No     Exercise Yes     sometimes walking     Seat Belt Yes     Social History Narrative       Review of Systems:  Skin:  Negative     Eyes:  Positive for glasses  ENT:  Negative    Respiratory:  Negative for shortness of breath;dyspnea on exertion;cough  Cardiovascular:  Negative;palpitations;chest pain;edema;dizziness;lightheadedness Positive for  Gastroenterology: Negative    Genitourinary:  Positive for nocturia  Musculoskeletal:  Positive  "for back pain  Neurologic:  Negative    Psychiatric:  Negative    Heme/Lymph/Imm:  Positive for allergies  Endocrine:  Negative      Physical Exam:  Vitals: /88  Pulse 90  Ht 1.626 m (5' 4\")  Wt 90.9 kg (200 lb 6.4 oz)  BMI 34.4 kg/m2    Constitutional:  cooperative, alert and oriented, well developed, well nourished, in no acute distress        Skin:  not assessed this visit        Head:  not assessed this visit        Eyes:  not assessed this visit        ENT:  not assessed this visit        Neck:  not assessed this visit        Chest:  not assessed this visit        Cardiac:   irregularly irregular rhythm                Abdomen:  not assessed this visit        Vascular: not assessed this visit                                      Extremities and Back:  not assessed this visit        Neurological:  not assessed this visit          Recent Lab Results:  LIPID RESULTS:  No results found for: CHOL, HDL, LDL, TRIG, CHOLHDLRATIO    LIVER ENZYME RESULTS:  Lab Results   Component Value Date    AST 16 05/27/2016    ALT 17 05/27/2016       CBC RESULTS:  Lab Results   Component Value Date    WBC 5.0 01/27/2017    RBC 4.61 01/27/2017    HGB 13.5 01/27/2017    HCT 40.9 01/27/2017    MCV 89 01/27/2017    MCH 29.3 01/27/2017    MCHC 33.0 01/27/2017    RDW 13.4 01/27/2017     01/27/2017       BMP RESULTS:  Lab Results   Component Value Date     01/27/2017    POTASSIUM 4.2 01/27/2017    CHLORIDE 105 01/27/2017    CO2 25 01/27/2017    ANIONGAP 6 01/27/2017    GLC 81 01/27/2017    BUN 12 01/27/2017    CR 0.73 01/27/2017    GFRESTIMATED 80 01/27/2017    GFRESTBLACK >90   GFR Calc   01/27/2017    [place] 8.5 01/27/2017        A1C RESULTS:  No results found for: A1C    INR RESULTS:  Lab Results   Component Value Date    INR 1.60 (H) 05/04/2015    INR 0.94 01/21/2013           CC  Mariana George PASalomeC  [address]  [place] [id number]                  "

## 2017-12-08 NOTE — MR AVS SNAPSHOT
After Visit Summary   12/8/2017    Ele Mendoza    MRN: [id number]           Patient Information     Date Of Birth          1950        Visit Information        Provider Department      12/8/2017 11:15 AM Mariana George PA-C; [employer] [facility]        Today's Diagnoses     Chest discomfort    -  1    Chronic atrial fibrillation (H)          Care Instructions    1. Stress test looked perfect! No evidence of blockages and good pumping strength of heart.    2. Limit caffeine!    3. See us back in ~ 6 months but call if you need to be seen sooner! My nurse Nisreen: [phone]          Follow-ups after your visit        Additional Services     Follow-Up with Cardiologist                 Future tests that were ordered for you today     Open Future Orders        Priority Expected Expires Ordered    EKG 12-lead complete w/read - Clinics Routine 6/6/2018 12/8/2018 12/8/2017    Follow-Up with Cardiologist Routine 6/6/2018 12/8/2018 12/8/2017            Who to contact     If you have questions or need follow up information about today's clinic visit or your schedule please contact [facility] directly at [phone].  Normal or non-critical lab and imaging results will be communicated to you by [employer], letter or phone within 4 business days after the clinic has received the results. If you do not hear from us within 7 days, please contact the clinic through [employer] or phone. If you have a critical or abnormal lab result, we will notify you by phone as soon as possible.  Submit refill requests through [employer] or call your pharmacy and they will forward the refill request to us. Please allow 3 business days for your refill to be completed.          Additional Information About Your Visit        [employer] Information     [employer] lets you send messages to your doctor, view your test results, renew  "your prescriptions, schedule appointments and more. To sign up, go to [email or web address] . Click on \"Log in\" on the left side of the screen, which will take you to the Welcome page. Then click on \"Sign up Now\" on the right side of the page.     You will be asked to enter the access code listed below, as well as some personal information. Please follow the directions to create your username and password.     Your access code is: [email or web address]  Expires: 3/5/2018  1:56 PM     Your access code will  in 90 days. If you need help or a new code, please call your [facility] clinic or [phone].        Care EveryWhere ID     This is your Care EveryWhere ID. This could be used by other organizations to access your [facility] medical records  [id number]        Your Vitals Were     Pulse Height BMI (Body Mass Index)             90 1.626 m (5' 4\") 34.4 kg/m2          Blood Pressure from Last 3 Encounters:   17 123/88   17 120/80   17 124/85    Weight from Last 3 Encounters:   17 90.9 kg (200 lb 6.4 oz)   17 91 kg (200 lb 9.6 oz)   17 86.2 kg (190 lb)              We Performed the Following     Follow-Up with Cardiac Advanced Practice Provider        Primary Care Provider Office Phone # Fax #    Nereida Leach [phone]       [facility] [address] [facility] [address]        Equal Access to Services     [facility] : Hadii coral white hadasho Soomaali, waaxda luqadaha, qaybta kaalmada adeegyakristina, ortiz gama . So [facility] [phone].    ATENCIÓN: Si habla español, tiene a bright disposición servicios gratuitos de asistencia lingüística. Llame al [phone].    We comply with applicable federal civil rights laws and [place] laws. We do not discriminate on the basis of race, color, national origin, age, disability, sex, sexual orientation, or gender identity.            Thank you!     Thank you for choosing [facility] " [facility]   [place]  for your care. Our goal is always to provide you with excellent care. Hearing back from our patients is one way we can continue to improve our services. Please take a few minutes to complete the written survey that you may receive in the mail after your visit with us. Thank you!             Your Updated Medication List - Protect others around you: Learn how to safely use, store and throw away your medicines at www.disposemymeds.org.          This list is accurate as of: 12/8/17 11:39 AM.  Always use your most recent med list.                   Brand Name Dispense Instructions for use Diagnosis    docusate sodium 100 MG tablet    COLACE     Take 100 mg by mouth 2 times daily        metoprolol 100 MG 24 hr tablet    TOPROL-XL    180 tablet    Take 1 tablet (100 mg) by mouth 2 times daily    Atrial flutter, unspecified type (H)       rivaroxaban ANTICOAGULANT 20 MG Tabs tablet    XARELTO    90 tablet    Take 1 tablet (20 mg) by mouth daily    Atrial flutter, unspecified type (H)       traMADol 50 MG tablet    ULTRAM    20 tablet    Take 1-2 tablets ( mg) by mouth every 6 hours as needed for pain

## 2018-01-01 ENCOUNTER — NURSE TRIAGE (OUTPATIENT)
Dept: NURSING | Facility: CLINIC | Age: 68
End: 2018-01-01

## 2018-01-01 DIAGNOSIS — I48.92 ATRIAL FLUTTER, UNSPECIFIED TYPE (H): ICD-10-CM

## 2018-01-01 RX ORDER — METOPROLOL SUCCINATE 100 MG/1
100 TABLET, EXTENDED RELEASE ORAL 2 TIMES DAILY
Qty: 180 TABLET | Refills: 2 | Status: SHIPPED | OUTPATIENT
Start: 2018-01-01

## 2018-03-27 NOTE — TELEPHONE ENCOUNTER
Reason for Disposition    Call about patient who is currently hospitalized     Per son, patient is currently in [place] and had a stroke, he is requesting to speak to the on-call cardiologist.     On call cardiologist/resident paged via the [facility]  at 9:20pm to call the son back directly at [phone]. Advised son to call [facility] back if no call from provider within 30 minutes.      Mayi Rouse RN  [facility]    Additional Information    Negative: Lab calling with strep throat test results and triager can call in prescription    Negative: Lab calling with urinalysis test results and triager can call in prescription    Negative: Medication questions    Negative: ED call to PCP    Negative: Physician call to PCP    Protocols used: PCP CALL - NO TRIAGE-ADULT-

## 2018-06-04 ENCOUNTER — DOCUMENTATION ONLY (OUTPATIENT)
Dept: CARDIOLOGY | Facility: CLINIC | Age: 68
End: 2018-06-04

## 2018-06-04 NOTE — PROGRESS NOTES
Called Ele's son to express condolences after notified of patient's death while in [place].    Leonela found out that she had run out of Xarelto 20 mg daily x 7 days prior to restarting it (chronic AFib). Then developed CVA with unilateral paralysis and subsequent deterioration. Per Leonela, no thrombolytic therapy used.    Leonela noted all 6 of her sons were with her when she .  They contacted the [employer] and passport was clipped/cut.    Offered condolences and support. Notified Medical Records and will have nursing staff remove orders so pt's family is not contacted to arrange FU for her.

## (undated) RX ORDER — REGADENOSON 0.08 MG/ML
INJECTION, SOLUTION INTRAVENOUS
Status: DISPENSED
Start: 2017-01-01